# Patient Record
Sex: MALE | URBAN - METROPOLITAN AREA
[De-identification: names, ages, dates, MRNs, and addresses within clinical notes are randomized per-mention and may not be internally consistent; named-entity substitution may affect disease eponyms.]

---

## 2020-08-31 ENCOUNTER — EMERGENCY (EMERGENCY)
Facility: HOSPITAL | Age: 55
LOS: 1 days | Discharge: HOME | End: 2020-08-31
Attending: EMERGENCY MEDICINE
Payer: MEDICARE

## 2020-08-31 VITALS
RESPIRATION RATE: 18 BRPM | DIASTOLIC BLOOD PRESSURE: 85 MMHG | SYSTOLIC BLOOD PRESSURE: 123 MMHG | TEMPERATURE: 98 F | HEART RATE: 70 BPM | OXYGEN SATURATION: 97 %

## 2020-08-31 VITALS
SYSTOLIC BLOOD PRESSURE: 138 MMHG | HEART RATE: 96 BPM | WEIGHT: 126.99 LBS | DIASTOLIC BLOOD PRESSURE: 87 MMHG | HEIGHT: 62.2 IN | RESPIRATION RATE: 18 BRPM | TEMPERATURE: 99 F | OXYGEN SATURATION: 99 %

## 2020-08-31 LAB
ALBUMIN SERPL ELPH-MCNC: 3.9 G/DL — SIGNIFICANT CHANGE UP (ref 3.5–5)
ALP SERPL-CCNC: 59 U/L — SIGNIFICANT CHANGE UP (ref 40–120)
ALT FLD-CCNC: 39 U/L DA — SIGNIFICANT CHANGE UP (ref 10–60)
ANION GAP SERPL CALC-SCNC: 4 MMOL/L — LOW (ref 5–17)
AST SERPL-CCNC: 42 U/L — HIGH (ref 10–40)
BASOPHILS # BLD AUTO: 0.02 K/UL — SIGNIFICANT CHANGE UP (ref 0–0.2)
BASOPHILS NFR BLD AUTO: 0.3 % — SIGNIFICANT CHANGE UP (ref 0–2)
BILIRUB SERPL-MCNC: 0.4 MG/DL — SIGNIFICANT CHANGE UP (ref 0.2–1.2)
BUN SERPL-MCNC: 9 MG/DL — SIGNIFICANT CHANGE UP (ref 7–18)
CALCIUM SERPL-MCNC: 9.2 MG/DL — SIGNIFICANT CHANGE UP (ref 8.4–10.5)
CHLORIDE SERPL-SCNC: 105 MMOL/L — SIGNIFICANT CHANGE UP (ref 96–108)
CK MB BLD-MCNC: 0.5 % — SIGNIFICANT CHANGE UP (ref 0–3.5)
CK MB CFR SERPL CALC: 1.7 NG/ML — SIGNIFICANT CHANGE UP (ref 0–3.6)
CK SERPL-CCNC: 313 U/L — HIGH (ref 35–232)
CO2 SERPL-SCNC: 31 MMOL/L — SIGNIFICANT CHANGE UP (ref 22–31)
CREAT SERPL-MCNC: 1.17 MG/DL — SIGNIFICANT CHANGE UP (ref 0.5–1.3)
EOSINOPHIL # BLD AUTO: 0.01 K/UL — SIGNIFICANT CHANGE UP (ref 0–0.5)
EOSINOPHIL NFR BLD AUTO: 0.2 % — SIGNIFICANT CHANGE UP (ref 0–6)
GLUCOSE SERPL-MCNC: 107 MG/DL — HIGH (ref 70–99)
HCT VFR BLD CALC: 40.5 % — SIGNIFICANT CHANGE UP (ref 39–50)
HGB BLD-MCNC: 13.9 G/DL — SIGNIFICANT CHANGE UP (ref 13–17)
IMM GRANULOCYTES NFR BLD AUTO: 0.2 % — SIGNIFICANT CHANGE UP (ref 0–1.5)
LYMPHOCYTES # BLD AUTO: 0.77 K/UL — LOW (ref 1–3.3)
LYMPHOCYTES # BLD AUTO: 12.3 % — LOW (ref 13–44)
MCHC RBC-ENTMCNC: 32 PG — SIGNIFICANT CHANGE UP (ref 27–34)
MCHC RBC-ENTMCNC: 34.3 GM/DL — SIGNIFICANT CHANGE UP (ref 32–36)
MCV RBC AUTO: 93.1 FL — SIGNIFICANT CHANGE UP (ref 80–100)
MONOCYTES # BLD AUTO: 0.49 K/UL — SIGNIFICANT CHANGE UP (ref 0–0.9)
MONOCYTES NFR BLD AUTO: 7.8 % — SIGNIFICANT CHANGE UP (ref 2–14)
NEUTROPHILS # BLD AUTO: 4.97 K/UL — SIGNIFICANT CHANGE UP (ref 1.8–7.4)
NEUTROPHILS NFR BLD AUTO: 79.2 % — HIGH (ref 43–77)
NRBC # BLD: 0 /100 WBCS — SIGNIFICANT CHANGE UP (ref 0–0)
PLATELET # BLD AUTO: 176 K/UL — SIGNIFICANT CHANGE UP (ref 150–400)
POTASSIUM SERPL-MCNC: 3.7 MMOL/L — SIGNIFICANT CHANGE UP (ref 3.5–5.3)
POTASSIUM SERPL-SCNC: 3.7 MMOL/L — SIGNIFICANT CHANGE UP (ref 3.5–5.3)
PROT SERPL-MCNC: 7.8 G/DL — SIGNIFICANT CHANGE UP (ref 6–8.3)
RBC # BLD: 4.35 M/UL — SIGNIFICANT CHANGE UP (ref 4.2–5.8)
RBC # FLD: 12.1 % — SIGNIFICANT CHANGE UP (ref 10.3–14.5)
SODIUM SERPL-SCNC: 140 MMOL/L — SIGNIFICANT CHANGE UP (ref 135–145)
TROPONIN I SERPL-MCNC: <0.015 NG/ML — SIGNIFICANT CHANGE UP (ref 0–0.04)
TROPONIN I SERPL-MCNC: <0.015 NG/ML — SIGNIFICANT CHANGE UP (ref 0–0.04)
WBC # BLD: 6.27 K/UL — SIGNIFICANT CHANGE UP (ref 3.8–10.5)
WBC # FLD AUTO: 6.27 K/UL — SIGNIFICANT CHANGE UP (ref 3.8–10.5)

## 2020-08-31 PROCEDURE — 71046 X-RAY EXAM CHEST 2 VIEWS: CPT

## 2020-08-31 PROCEDURE — 99284 EMERGENCY DEPT VISIT MOD MDM: CPT | Mod: 25

## 2020-08-31 PROCEDURE — 85027 COMPLETE CBC AUTOMATED: CPT

## 2020-08-31 PROCEDURE — 80053 COMPREHEN METABOLIC PANEL: CPT

## 2020-08-31 PROCEDURE — 82550 ASSAY OF CK (CPK): CPT

## 2020-08-31 PROCEDURE — 96374 THER/PROPH/DIAG INJ IV PUSH: CPT

## 2020-08-31 PROCEDURE — 71046 X-RAY EXAM CHEST 2 VIEWS: CPT | Mod: 26

## 2020-08-31 PROCEDURE — 82553 CREATINE MB FRACTION: CPT

## 2020-08-31 PROCEDURE — 84484 ASSAY OF TROPONIN QUANT: CPT

## 2020-08-31 PROCEDURE — 36415 COLL VENOUS BLD VENIPUNCTURE: CPT

## 2020-08-31 PROCEDURE — 93005 ELECTROCARDIOGRAM TRACING: CPT

## 2020-08-31 RX ORDER — FAMOTIDINE 10 MG/ML
20 INJECTION INTRAVENOUS ONCE
Refills: 0 | Status: COMPLETED | OUTPATIENT
Start: 2020-08-31 | End: 2020-08-31

## 2020-08-31 RX ADMIN — FAMOTIDINE 20 MILLIGRAM(S): 10 INJECTION INTRAVENOUS at 16:38

## 2020-08-31 NOTE — ED PROVIDER NOTE - PRO INTERPRETER NEED 2
Preference obtained for [facility].  Referral sent via [employer]. Home Health orders in the chart.    Faxed Wound vac orders, H&P, op note and facesheet to [facility].  Wound vac will be delivered in the morning.       08/19/20 9122   Post-Acute Status   Post-Acute Authorization HME;Home Health   HME Status Set-up Complete   Home Health Status Set-up Complete      English

## 2020-08-31 NOTE — ED PROVIDER NOTE - OBJECTIVE STATEMENT
56 y/o male with PMhx of high cholesterol presents with burning chest pain since yesterday.  Pt rated pain 2/10 in intensity.  pt denies fever, shortness of breath, diaphoresis, nasuea or vomiting.  Pt has never had anything similar.  Pt currently chest pain free.

## 2020-08-31 NOTE — ED PROVIDER NOTE - CLINICAL SUMMARY MEDICAL DECISION MAKING FREE TEXT BOX
pt presents with burning chest pain since yesterday.  Normal exam and EKG.  Will check labs, trop x2, CXR, and reassess.

## 2020-08-31 NOTE — ED ADULT NURSE NOTE - OBJECTIVE STATEMENT
Pt c/o burning sensation / chest pain since yesterday. No acute distress noted, EKG completed. No shortness of breath indicated. Safety maintained.

## 2020-08-31 NOTE — ED PROVIDER NOTE - PATIENT PORTAL LINK FT
You can access the FollowMyHealth Patient Portal offered by [facility] by registering at the following website: [email or web address]. By joining [facility]’s FollowMyHealth portal, you will also be able to view your health information using other applications (apps) compatible with our system.

## 2020-08-31 NOTE — ED ADULT NURSE REASSESSMENT NOTE - NS ED NURSE REASSESS COMMENT FT1
Pt resting in bed, awaiting second troponin results. no acute distress noted, denies chest pain, no shortness of breath indicated. Safety maintained.

## 2020-08-31 NOTE — ED ADULT NURSE NOTE - NS ED NURSE RECORD ANOTHER VITAL SIGN
The patient was signed out to me by Dr. Bledsoe at the end of his shift for follow-up on intake.  The sign-out included relevant details of the history, physical, and work-up to date. The chart has been reviewed, new test results have been noted, and the patient has been re-evaluated.      Labs  Results for orders placed or performed during the hospital encounter of 08/02/19   CBC & Auto Differential   Result Value    WBC 7.6    RBC 4.48 (L)    HGB 13.3    HCT 40.0    MCV 89.3    MCH 29.7    MCHC 33.3    RDW-CV 13.2        NRBC 0    DIFF TYPE AUTOMATED DIFFERENTIAL    Neutrophil 64    LYMPH 25    MONO 9    EOSIN 1    BASO 1    Percent Immature Granuloctyes 0    Absolute Neutrophil 4.9    Absolute Lymph 1.9    Absolute Mono 0.7    Absolute Eos 0.1    Absolute Baso 0.0    Absolute Immature Granulocytes 0.0   Alcohol Level Serum   Result Value    Alcohol Ethyl None detected.   Drug Abuse Screen Basic Urine   Result Value    U-Amphetamines NEGATIVE     Comment: Cutoff = 500ng/ml    U-Barbiturates NEGATIVE     Comment: Cutoff = 200 ng/mL    U-Benzodiazepines NEGATIVE     Comment: Cutoff = 200 ng/mL    Cannabinoids (THC) UA POSITIVE (A)     Comment: cutoff = 50 ng/mL    U-Cocaine/Metabolite POSITIVE (A)     Comment: cutoff = 150 ng/ml    Opiates UA POSITIVE (A)     Comment: cutoff = 300 ng/mL    U-PHENCYCLIDINE NEGATIVE     Comment: Cutoff = 25 ng/mL  Drug screening is for medical purposes only.  A confirmation by GC/MS may be ordered to verify clinically questionable false positive results.     Acetaminophen Level   Result Value    ACETAMINOPHEN <2 (L)   Salicylate Level   Result Value    SALICYLATE <2.8   Chem 8 Panel - Point of Care   Result Value    Sodium     Potassium POC 4.1    Chloride     CALCIUM IONIZED-POC 1.21    CO2 Total 30 (H)    GLUCOSE POC 87     Comment: Reference range is for a fasting sample.    BUN POC 13    HEMATOCRIT POC 40.0    Hemoglobin POC 13.6    ANION GAP POC 12     Creatinine POC 0.90    Estimated GFR  (POC) >90     Comment: eGFR results = or >90 mL/min/1.73m2 = Normal kidney function.    Estimated GFR Non- (POC) >90     Comment: eGFR results = or >90 mL/min/1.73m2 = Normal kidney function.   Troponin I - Point of Care   Result Value    Troponin I POC <0.10       Radiology  XR Chest AP or PA   Final Result   IMPRESSION:       Normal frontal chest radiograph.      I have personally reviewed the images and modified the resident's report as   necessary.             Medications  Medications - No data to display    Vitals  Vitals:    08/02/19 1514   BP: 127/77   Pulse: 96   Resp: 20   Temp: 97.5 °F (36.4 °C)   TempSrc: Oral   SpO2: 98%   Weight: 70 kg   Height: 5' 3\" (1.6 m)       ED Course    5:56 PM: Spoke with Magalys from [facility]. Due to patient's insurance, his only option is [facility] at this time.    6:01 PM: Discussed with patient that his labs and chest xray are unremarkable. Offered patient transfer to [facility] for [facility]. Patient is agreeable to [facility]. He states he last used today.    6:06 PM: Dr. Medellin from [facility] to [facility].    MDM  6:07 PM Does this patient meet Severe Sepsis criteria by CMS SEP-1 definition? No     6:07 PM Does this patient meet Septic Shock criteria by CMS SEP-1 definition? No      Clinical Impression:  ED Diagnosis        Final diagnosis    Drug abuse (CMS/[facility])                    Pt to be transferred to Dr. Medellin in stable condition.     Critical Care time spent on this patient outside of billable procedures:  None        I have reviewed the information recorded by the scribe for accuracy and agree with its contents.    ____________________________________________________________________    Ana Rosa Jaffe acting as a scribe for Dr. Marcio Faulkner  Dictation # [id number]  Scribe: Ana Rosa Faulkner MD  08/02/19 7706     Yes

## 2022-07-15 NOTE — ED ADULT NURSE NOTE - CAS DISCH TRANSFER METHOD
Is This A New Presentation, Or A Follow-Up?: Skin Lesions How Severe Is Your Skin Lesion?: mild Have Your Skin Lesions Been Treated?: not been treated Walking